# Patient Record
Sex: FEMALE | URBAN - METROPOLITAN AREA
[De-identification: names, ages, dates, MRNs, and addresses within clinical notes are randomized per-mention and may not be internally consistent; named-entity substitution may affect disease eponyms.]

---

## 2019-04-25 NOTE — PATIENT DISCUSSION
BOTOX for COSMETIC (20 units): The patient presented with multiple facial rhytids after informed consent the patient was treated with Botox at a dosage documented on [employer] in this cart. The patient tolerated the procedure well.

## 2022-07-12 ENCOUNTER — ESTABLISHED PATIENT (OUTPATIENT)
Facility: LOCATION | Age: 72
End: 2022-07-12

## 2022-12-14 ENCOUNTER — ESTABLISHED PATIENT (OUTPATIENT)
Facility: LOCATION | Age: 72
End: 2022-12-14

## 2022-12-14 PROCEDURE — 99213 OFFICE O/P EST LOW 20 MIN: CPT

## 2022-12-14 PROCEDURE — 92025 CPTRIZED CORNEAL TOPOGRAPHY: CPT

## 2022-12-14 ASSESSMENT — VISUAL ACUITY
OS_SC: 20/20
OU_SC: 20/25
OU_SC: J1
OS_SC: J1
OD_SC: 20/50
OD_SC: J2

## 2022-12-14 ASSESSMENT — TONOMETRY
OD_IOP_MMHG: 13
OS_IOP_MMHG: 14

## 2023-01-30 ENCOUNTER — ESTABLISHED PATIENT (OUTPATIENT)
Facility: LOCATION | Age: 73
End: 2023-01-30

## 2023-01-30 DIAGNOSIS — H52.221: ICD-10-CM

## 2023-01-30 PROCEDURE — 92025 CPTRIZED CORNEAL TOPOGRAPHY: CPT

## 2023-01-30 PROCEDURE — 92012 INTRM OPH EXAM EST PATIENT: CPT

## 2023-01-30 ASSESSMENT — VISUAL ACUITY
OS_SC: J1+
OU_SC: J1+
OD_SC: J1
OU_SC: 20/25
OS_SC: 20/25
OD_SC: 20/40